# Patient Record
Sex: FEMALE | Race: OTHER
[De-identification: names, ages, dates, MRNs, and addresses within clinical notes are randomized per-mention and may not be internally consistent; named-entity substitution may affect disease eponyms.]

---

## 2017-03-10 ENCOUNTER — RX RENEWAL (OUTPATIENT)
Age: 47
End: 2017-03-10

## 2017-03-21 DIAGNOSIS — R92.8 OTHER ABNORMAL AND INCONCLUSIVE FINDINGS ON DIAGNOSTIC IMAGING OF BREAST: ICD-10-CM

## 2017-03-24 ENCOUNTER — OUTPATIENT (OUTPATIENT)
Dept: OUTPATIENT SERVICES | Facility: HOSPITAL | Age: 47
LOS: 1 days | Discharge: HOME | End: 2017-03-24

## 2017-05-24 ENCOUNTER — RX RENEWAL (OUTPATIENT)
Age: 47
End: 2017-05-24

## 2017-06-27 DIAGNOSIS — R92.8 OTHER ABNORMAL AND INCONCLUSIVE FINDINGS ON DIAGNOSTIC IMAGING OF BREAST: ICD-10-CM

## 2017-07-17 ENCOUNTER — RX RENEWAL (OUTPATIENT)
Age: 47
End: 2017-07-17

## 2017-08-11 ENCOUNTER — APPOINTMENT (OUTPATIENT)
Dept: HEMATOLOGY ONCOLOGY | Facility: CLINIC | Age: 47
End: 2017-08-11

## 2017-08-18 ENCOUNTER — OUTPATIENT (OUTPATIENT)
Dept: OUTPATIENT SERVICES | Facility: HOSPITAL | Age: 47
LOS: 1 days | Discharge: HOME | End: 2017-08-18

## 2017-08-18 ENCOUNTER — APPOINTMENT (OUTPATIENT)
Dept: HEMATOLOGY ONCOLOGY | Facility: CLINIC | Age: 47
End: 2017-08-18

## 2017-08-18 VITALS
BODY MASS INDEX: 26.87 KG/M2 | HEART RATE: 80 BPM | DIASTOLIC BLOOD PRESSURE: 68 MMHG | HEIGHT: 62 IN | SYSTOLIC BLOOD PRESSURE: 102 MMHG | WEIGHT: 146 LBS | TEMPERATURE: 99.1 F | RESPIRATION RATE: 16 BRPM

## 2017-08-21 DIAGNOSIS — D05.01 LOBULAR CARCINOMA IN SITU OF RIGHT BREAST: ICD-10-CM

## 2017-08-21 DIAGNOSIS — D05.02 LOBULAR CARCINOMA IN SITU OF LEFT BREAST: ICD-10-CM

## 2017-08-27 LAB — B-HCG SERPL-ACNC: < 0.6 MIU/ML

## 2017-09-14 ENCOUNTER — APPOINTMENT (OUTPATIENT)
Dept: BREAST CENTER | Facility: CLINIC | Age: 47
End: 2017-09-14
Payer: MEDICAID

## 2017-09-14 VITALS
HEIGHT: 62 IN | BODY MASS INDEX: 26.31 KG/M2 | WEIGHT: 143 LBS | DIASTOLIC BLOOD PRESSURE: 80 MMHG | SYSTOLIC BLOOD PRESSURE: 112 MMHG

## 2017-09-14 VITALS
DIASTOLIC BLOOD PRESSURE: 72 MMHG | SYSTOLIC BLOOD PRESSURE: 110 MMHG | BODY MASS INDEX: 26.87 KG/M2 | HEIGHT: 62 IN | WEIGHT: 146 LBS

## 2017-09-14 DIAGNOSIS — E03.9 HYPOTHYROIDISM, UNSPECIFIED: ICD-10-CM

## 2017-09-14 PROCEDURE — 99213 OFFICE O/P EST LOW 20 MIN: CPT

## 2017-09-14 RX ORDER — LEVOTHYROXINE SODIUM 0.17 MG/1
TABLET ORAL
Refills: 0 | Status: ACTIVE | COMMUNITY

## 2017-11-14 ENCOUNTER — RX RENEWAL (OUTPATIENT)
Age: 47
End: 2017-11-14

## 2017-11-15 ENCOUNTER — RX RENEWAL (OUTPATIENT)
Age: 47
End: 2017-11-15

## 2018-02-14 ENCOUNTER — RX RENEWAL (OUTPATIENT)
Age: 48
End: 2018-02-14

## 2018-02-23 ENCOUNTER — APPOINTMENT (OUTPATIENT)
Dept: HEMATOLOGY ONCOLOGY | Facility: CLINIC | Age: 48
End: 2018-02-23

## 2018-03-13 ENCOUNTER — OUTPATIENT (OUTPATIENT)
Dept: OUTPATIENT SERVICES | Facility: HOSPITAL | Age: 48
LOS: 1 days | Discharge: HOME | End: 2018-03-13

## 2018-03-13 DIAGNOSIS — Z12.31 ENCOUNTER FOR SCREENING MAMMOGRAM FOR MALIGNANT NEOPLASM OF BREAST: ICD-10-CM

## 2018-03-22 ENCOUNTER — APPOINTMENT (OUTPATIENT)
Dept: BREAST CENTER | Facility: CLINIC | Age: 48
End: 2018-03-22
Payer: MEDICAID

## 2018-03-22 VITALS
SYSTOLIC BLOOD PRESSURE: 128 MMHG | HEART RATE: 120 BPM | HEIGHT: 62 IN | BODY MASS INDEX: 32.2 KG/M2 | OXYGEN SATURATION: 96 % | WEIGHT: 175 LBS | DIASTOLIC BLOOD PRESSURE: 80 MMHG

## 2018-03-22 DIAGNOSIS — Z80.3 FAMILY HISTORY OF MALIGNANT NEOPLASM OF BREAST: ICD-10-CM

## 2018-03-22 PROCEDURE — 99212 OFFICE O/P EST SF 10 MIN: CPT

## 2018-03-28 ENCOUNTER — APPOINTMENT (OUTPATIENT)
Dept: HEMATOLOGY ONCOLOGY | Facility: CLINIC | Age: 48
End: 2018-03-28

## 2018-04-14 ENCOUNTER — EMERGENCY (EMERGENCY)
Facility: HOSPITAL | Age: 48
LOS: 0 days | Discharge: HOME | End: 2018-04-14
Attending: STUDENT IN AN ORGANIZED HEALTH CARE EDUCATION/TRAINING PROGRAM

## 2018-04-14 VITALS
OXYGEN SATURATION: 100 % | SYSTOLIC BLOOD PRESSURE: 134 MMHG | DIASTOLIC BLOOD PRESSURE: 67 MMHG | TEMPERATURE: 98 F | RESPIRATION RATE: 18 BRPM | HEART RATE: 100 BPM

## 2018-04-14 VITALS
RESPIRATION RATE: 18 BRPM | TEMPERATURE: 97 F | HEART RATE: 90 BPM | OXYGEN SATURATION: 100 % | SYSTOLIC BLOOD PRESSURE: 132 MMHG | DIASTOLIC BLOOD PRESSURE: 67 MMHG

## 2018-04-14 DIAGNOSIS — R10.9 UNSPECIFIED ABDOMINAL PAIN: ICD-10-CM

## 2018-04-14 DIAGNOSIS — R10.12 LEFT UPPER QUADRANT PAIN: ICD-10-CM

## 2018-04-14 DIAGNOSIS — R10.13 EPIGASTRIC PAIN: ICD-10-CM

## 2018-04-14 DIAGNOSIS — F41.9 ANXIETY DISORDER, UNSPECIFIED: ICD-10-CM

## 2018-04-14 DIAGNOSIS — E03.9 HYPOTHYROIDISM, UNSPECIFIED: ICD-10-CM

## 2018-04-14 LAB
ALBUMIN SERPL ELPH-MCNC: 3.6 G/DL — SIGNIFICANT CHANGE UP (ref 3.5–5.2)
ALP SERPL-CCNC: 47 U/L — SIGNIFICANT CHANGE UP (ref 30–115)
ALT FLD-CCNC: 14 U/L — SIGNIFICANT CHANGE UP (ref 0–41)
ANION GAP SERPL CALC-SCNC: 11 MMOL/L — SIGNIFICANT CHANGE UP (ref 7–14)
APPEARANCE UR: (no result)
AST SERPL-CCNC: 16 U/L — SIGNIFICANT CHANGE UP (ref 0–41)
BASOPHILS # BLD AUTO: 0.03 K/UL — SIGNIFICANT CHANGE UP (ref 0–0.2)
BASOPHILS NFR BLD AUTO: 0.3 % — SIGNIFICANT CHANGE UP (ref 0–1)
BILIRUB SERPL-MCNC: 0.2 MG/DL — SIGNIFICANT CHANGE UP (ref 0.2–1.2)
BILIRUB UR-MCNC: NEGATIVE — SIGNIFICANT CHANGE UP
BUN SERPL-MCNC: 13 MG/DL — SIGNIFICANT CHANGE UP (ref 10–20)
CALCIUM SERPL-MCNC: 8.4 MG/DL — LOW (ref 8.5–10.1)
CHLORIDE SERPL-SCNC: 100 MMOL/L — SIGNIFICANT CHANGE UP (ref 98–110)
CO2 SERPL-SCNC: 24 MMOL/L — SIGNIFICANT CHANGE UP (ref 17–32)
COLOR SPEC: YELLOW — SIGNIFICANT CHANGE UP
CREAT SERPL-MCNC: 0.6 MG/DL — LOW (ref 0.7–1.5)
DIFF PNL FLD: (no result)
EOSINOPHIL # BLD AUTO: 0.14 K/UL — SIGNIFICANT CHANGE UP (ref 0–0.7)
EOSINOPHIL NFR BLD AUTO: 1.4 % — SIGNIFICANT CHANGE UP (ref 0–8)
GLUCOSE SERPL-MCNC: 99 MG/DL — SIGNIFICANT CHANGE UP (ref 70–99)
GLUCOSE UR QL: NEGATIVE MG/DL — SIGNIFICANT CHANGE UP
HCT VFR BLD CALC: 31.5 % — LOW (ref 37–47)
HGB BLD-MCNC: 10.2 G/DL — LOW (ref 12–16)
IMM GRANULOCYTES NFR BLD AUTO: 0.3 % — SIGNIFICANT CHANGE UP (ref 0.1–0.3)
KETONES UR-MCNC: NEGATIVE — SIGNIFICANT CHANGE UP
LEUKOCYTE ESTERASE UR-ACNC: (no result)
LIDOCAIN IGE QN: 149 U/L — HIGH (ref 7–60)
LYMPHOCYTES # BLD AUTO: 2.18 K/UL — SIGNIFICANT CHANGE UP (ref 1.2–3.4)
LYMPHOCYTES # BLD AUTO: 22.3 % — SIGNIFICANT CHANGE UP (ref 20.5–51.1)
MCHC RBC-ENTMCNC: 27 PG — SIGNIFICANT CHANGE UP (ref 27–31)
MCHC RBC-ENTMCNC: 32.4 G/DL — SIGNIFICANT CHANGE UP (ref 32–37)
MCV RBC AUTO: 83.3 FL — SIGNIFICANT CHANGE UP (ref 81–99)
MONOCYTES # BLD AUTO: 0.9 K/UL — HIGH (ref 0.1–0.6)
MONOCYTES NFR BLD AUTO: 9.2 % — SIGNIFICANT CHANGE UP (ref 1.7–9.3)
NEUTROPHILS # BLD AUTO: 6.49 K/UL — SIGNIFICANT CHANGE UP (ref 1.4–6.5)
NEUTROPHILS NFR BLD AUTO: 66.5 % — SIGNIFICANT CHANGE UP (ref 42.2–75.2)
NITRITE UR-MCNC: NEGATIVE — SIGNIFICANT CHANGE UP
PH UR: 6 — SIGNIFICANT CHANGE UP (ref 5–8)
PLATELET # BLD AUTO: 397 K/UL — SIGNIFICANT CHANGE UP (ref 130–400)
POTASSIUM SERPL-MCNC: 4.4 MMOL/L — SIGNIFICANT CHANGE UP (ref 3.5–5)
POTASSIUM SERPL-SCNC: 4.4 MMOL/L — SIGNIFICANT CHANGE UP (ref 3.5–5)
PROT SERPL-MCNC: 7 G/DL — SIGNIFICANT CHANGE UP (ref 6–8)
PROT UR-MCNC: (no result) MG/DL
RBC # BLD: 3.78 M/UL — LOW (ref 4.2–5.4)
RBC # FLD: 13.2 % — SIGNIFICANT CHANGE UP (ref 11.5–14.5)
SODIUM SERPL-SCNC: 135 MMOL/L — SIGNIFICANT CHANGE UP (ref 135–146)
SP GR SPEC: 1.02 — SIGNIFICANT CHANGE UP (ref 1.01–1.03)
UROBILINOGEN FLD QL: 0.2 MG/DL — SIGNIFICANT CHANGE UP (ref 0.2–0.2)
WBC # BLD: 9.77 K/UL — SIGNIFICANT CHANGE UP (ref 4.8–10.8)
WBC # FLD AUTO: 9.77 K/UL — SIGNIFICANT CHANGE UP (ref 4.8–10.8)

## 2018-04-14 RX ORDER — DIPHENHYDRAMINE HYDROCHLORIDE AND LIDOCAINE HYDROCHLORIDE AND ALUMINUM HYDROXIDE AND MAGNESIUM HYDRO
30 KIT ONCE
Qty: 0 | Refills: 0 | Status: COMPLETED | OUTPATIENT
Start: 2018-04-14 | End: 2018-04-14

## 2018-04-14 RX ORDER — ACETAMINOPHEN 500 MG
975 TABLET ORAL ONCE
Qty: 0 | Refills: 0 | Status: COMPLETED | OUTPATIENT
Start: 2018-04-14 | End: 2018-04-14

## 2018-04-14 RX ORDER — IBUPROFEN 200 MG
600 TABLET ORAL ONCE
Qty: 0 | Refills: 0 | Status: COMPLETED | OUTPATIENT
Start: 2018-04-14 | End: 2018-04-14

## 2018-04-14 RX ADMIN — DIPHENHYDRAMINE HYDROCHLORIDE AND LIDOCAINE HYDROCHLORIDE AND ALUMINUM HYDROXIDE AND MAGNESIUM HYDRO 30 MILLILITER(S): KIT at 12:24

## 2018-04-14 RX ADMIN — Medication 975 MILLIGRAM(S): at 12:24

## 2018-04-14 RX ADMIN — Medication 600 MILLIGRAM(S): at 12:25

## 2018-04-14 NOTE — ED PROVIDER NOTE - PHYSICAL EXAMINATION
Constitutional: Well developed, well nourished. NAD.  Head: Normocephalic, atraumatic.  Eyes: PERRL. EOMI.  ENT: No nasal discharge. Mucous membranes moist.  Neck: Supple. Painless ROM.  Cardiovascular: Normal S1, S2. Regular rate and rhythm. No murmurs, rubs, or gallops.  Pulmonary: Normal respiratory rate and effort. Lungs clear to auscultation bilaterally. No wheezing, rales, or rhonchi.  Abdominal: Soft. Nondistended. Mild LUQ tenderness. No rebound, guarding, rigidity.  Extremities. Pelvis stable. No lower extremity edema, symmetric calves.  Skin: No rashes, cyanosis.  Neuro: AAOx3. No focal neurological deficits.  Psych: Flat affect. No SI/HI.

## 2018-04-14 NOTE — ED PROVIDER NOTE - OBJECTIVE STATEMENT
Pt is a 47 y/o female with hx of hypothyroidism, anxiety, uterine fibroids presents to ED for abd pain for 1 month, intermittent. Pt states pain is worse when her anxiety is acting up. Pt states pain is worse in LUQ. No n/v/d, fever, urinary complaints, vaginal bleeding/discharge. Pt states her anxiety is not worse than usual. No SI/HI. Pt does not want to talk to psychiatrist.

## 2018-04-14 NOTE — ED PROVIDER NOTE - ATTENDING CONTRIBUTION TO CARE
49 yo F pmh thyroid d/o, p/w intermittent R sided crampy/ sharp abd pain x 2wks.  Worse w/ moveemnt, relieved in some position.  NO fever, cp, sob, v/d, urinary sx, hx renal stones.  PE benign.  IMP: abd pain, NL exam.  P: analgesia, labs, ekg, ua, reassess.

## 2018-04-14 NOTE — ED PROVIDER NOTE - PROGRESS NOTE DETAILS
Labs reviewed and discussed with patient. Patient feels better at this time. Mild epigastric pain but no RUQ pain. Pt will follow up with PCP in 2 days. Strict return precautions given. Pt understands and agrees with plan.

## 2018-06-22 ENCOUNTER — OUTPATIENT (OUTPATIENT)
Dept: OUTPATIENT SERVICES | Facility: HOSPITAL | Age: 48
LOS: 1 days | Discharge: HOME | End: 2018-06-22

## 2018-06-22 ENCOUNTER — APPOINTMENT (OUTPATIENT)
Dept: HEMATOLOGY ONCOLOGY | Facility: CLINIC | Age: 48
End: 2018-06-22

## 2018-06-22 ENCOUNTER — LABORATORY RESULT (OUTPATIENT)
Age: 48
End: 2018-06-22

## 2018-06-22 VITALS
WEIGHT: 198 LBS | HEIGHT: 62 IN | BODY MASS INDEX: 36.44 KG/M2 | RESPIRATION RATE: 16 BRPM | DIASTOLIC BLOOD PRESSURE: 82 MMHG | SYSTOLIC BLOOD PRESSURE: 110 MMHG | HEART RATE: 102 BPM | TEMPERATURE: 99.1 F

## 2018-06-22 RX ORDER — TAMOXIFEN CITRATE 20 MG/1
20 TABLET, FILM COATED ORAL DAILY
Qty: 90 | Refills: 2 | Status: ACTIVE | COMMUNITY
Start: 2017-08-18 | End: 1900-01-01

## 2018-06-25 DIAGNOSIS — D05.02 LOBULAR CARCINOMA IN SITU OF LEFT BREAST: ICD-10-CM

## 2018-06-25 DIAGNOSIS — D05.01 LOBULAR CARCINOMA IN SITU OF RIGHT BREAST: ICD-10-CM

## 2018-08-11 LAB
ALBUMIN SERPL ELPH-MCNC: 3.7 G/DL
ALP BLD-CCNC: 57 U/L
ALT SERPL-CCNC: 14 U/L
ANION GAP SERPL CALC-SCNC: 13 MMOL/L
AST SERPL-CCNC: 16 U/L
BILIRUB SERPL-MCNC: <0.2 MG/DL
BUN SERPL-MCNC: 13 MG/DL
CALCIUM SERPL-MCNC: 8.8 MG/DL
CHLORIDE SERPL-SCNC: 101 MMOL/L
CO2 SERPL-SCNC: 26 MMOL/L
CREAT SERPL-MCNC: 0.7 MG/DL
GLUCOSE SERPL-MCNC: 109 MG/DL
HCT VFR BLD CALC: 33 %
HGB BLD-MCNC: 10.4 G/DL
MCHC RBC-ENTMCNC: 25.4 PG
MCHC RBC-ENTMCNC: 31.5 G/DL
MCV RBC AUTO: 80.7 FL
PLATELET # BLD AUTO: 362 K/UL
PMV BLD: 9.2 FL
POTASSIUM SERPL-SCNC: 3.9 MMOL/L
PROT SERPL-MCNC: 7.2 G/DL
RBC # BLD: 4.09 M/UL
RBC # FLD: 14.6 %
SODIUM SERPL-SCNC: 140 MMOL/L
T4 FREE SERPL-MCNC: 1.3 NG/DL
TSH SERPL-ACNC: 2.69 UIU/ML
WBC # FLD AUTO: 8.15 K/UL

## 2018-10-26 ENCOUNTER — RX RENEWAL (OUTPATIENT)
Age: 48
End: 2018-10-26

## 2019-01-11 ENCOUNTER — APPOINTMENT (OUTPATIENT)
Dept: HEMATOLOGY ONCOLOGY | Facility: CLINIC | Age: 49
End: 2019-01-11

## 2019-01-11 ENCOUNTER — OUTPATIENT (OUTPATIENT)
Dept: OUTPATIENT SERVICES | Facility: HOSPITAL | Age: 49
LOS: 1 days | Discharge: HOME | End: 2019-01-11

## 2019-01-11 VITALS
HEIGHT: 62 IN | DIASTOLIC BLOOD PRESSURE: 71 MMHG | BODY MASS INDEX: 36.44 KG/M2 | TEMPERATURE: 97.6 F | HEART RATE: 85 BPM | WEIGHT: 198 LBS | SYSTOLIC BLOOD PRESSURE: 114 MMHG | RESPIRATION RATE: 14 BRPM

## 2019-01-11 VITALS — WEIGHT: 185 LBS | BODY MASS INDEX: 33.84 KG/M2

## 2019-01-11 PROBLEM — F41.9 ANXIETY DISORDER, UNSPECIFIED: Chronic | Status: ACTIVE | Noted: 2018-04-14

## 2019-01-11 PROBLEM — E03.9 HYPOTHYROIDISM, UNSPECIFIED: Chronic | Status: ACTIVE | Noted: 2018-04-14

## 2019-01-11 NOTE — CONSULT LETTER
[Dear  ___] : Dear  [unfilled], [Courtesy Letter:] : I had the pleasure of seeing your patient, [unfilled], in my office today. [Please see my note below.] : Please see my note below. [Sincerely,] : Sincerely, [FreeTextEntry3] : Damion Campbell MD

## 2019-01-11 NOTE — HISTORY OF PRESENT ILLNESS
[de-identified] : This is a 46yearold  female who is here today for her followup visit.  She has a history of left breast lobular carcinoma in situ and atypical lobular hyperplasia.  She had left breast lumpectomy in 04/2013.  In 12/2014, she had a right breast needlelocalized lumpectomy which revealed focal atypical ductal hyperplasia, widespread lobular carcinoma in situ, classical type associated with calcifications and atypical lobular hyperplasia.  Since 12/2014, she started on tamoxifen for breast cancer risk reduction.  She was also found to have uterine fibroids and followed up by her GYN.  On 3/24/17, she had left breast dx mammo and sonogram, which showed benign cyst. \par Rest of review of systems is negative.\par \par  [de-identified] : 6/22/18: \par Doing well. Denies any major complaints. Did not Tamoxifen for one month as she ran out of prescription. Menstrual cycles are irregular. \par \par 1/11/19\par Patient came in today for follow up visit. She offers no new breast complaints.  She has been taking Tamoxifen since 12/2014 without complaints. Menstrual cycles have been irregular.  LMP 11/2018.  She saw her GYN 9/2018 and had ultrasound done.  Pt states it was normal.  Last mammogram was done 3/2018 which showed no malignancy.  MRI of breast was ordered by Dr. Johnson on 9/2018 which was not done yet. (insurance issues?). Last mammo in March 2018 was normal.

## 2019-01-11 NOTE — PHYSICAL EXAM
[Fully active, able to carry on all pre-disease performance without restriction] : Status 0 - Fully active, able to carry on all pre-disease performance without restriction [Normal] : affect appropriate [de-identified] : Status post bilateral lumpectomy.  There is no palpable mass or skin lesion.  No palpable axillary lymphadenopathy.

## 2019-01-11 NOTE — ASSESSMENT
[FreeTextEntry1] : 1. History of left breast lobular carcinoma in situ and atypical lobular hyperplasia, status post lumpectomy.\par 2. Right breast lobular carcinoma in situ atypical lobular hyperplasia and atypical ductal hyperplasia status post right breast lumpectomy.\par \par PLAN:  \par -- Continue Tamoxifen for breast cancer risk reduction.  A prescription was electronically prescribed for her. She     will finish 5 years in 12/2019. \par -- Bilateral screening mammogram in March 2019.\par -- Will order b/l breast MRI in 9/2019. \par -- Follow up with GYN regularly.\par -- Follow up visit in 6 months with Dr. Campbell. \par \par Case was seen and discussed with Dr. Campbell who agreed with the assessment and plan.\par \par \par

## 2019-01-14 DIAGNOSIS — D05.02 LOBULAR CARCINOMA IN SITU OF LEFT BREAST: ICD-10-CM

## 2019-01-14 DIAGNOSIS — Z79.810 LONG TERM (CURRENT) USE OF SELECTIVE ESTROGEN RECEPTOR MODULATORS (SERMS): ICD-10-CM

## 2019-01-14 DIAGNOSIS — D05.01 LOBULAR CARCINOMA IN SITU OF RIGHT BREAST: ICD-10-CM

## 2019-04-23 ENCOUNTER — FORM ENCOUNTER (OUTPATIENT)
Age: 49
End: 2019-04-23

## 2019-04-24 ENCOUNTER — OUTPATIENT (OUTPATIENT)
Dept: OUTPATIENT SERVICES | Facility: HOSPITAL | Age: 49
LOS: 1 days | Discharge: HOME | End: 2019-04-24
Payer: MEDICAID

## 2019-04-24 DIAGNOSIS — R92.2 INCONCLUSIVE MAMMOGRAM: ICD-10-CM

## 2019-04-24 DIAGNOSIS — Z12.31 ENCOUNTER FOR SCREENING MAMMOGRAM FOR MALIGNANT NEOPLASM OF BREAST: ICD-10-CM

## 2019-04-24 PROCEDURE — 76641 ULTRASOUND BREAST COMPLETE: CPT | Mod: 26,50

## 2019-04-24 PROCEDURE — 77067 SCR MAMMO BI INCL CAD: CPT | Mod: 26

## 2019-04-24 PROCEDURE — 77063 BREAST TOMOSYNTHESIS BI: CPT | Mod: 26

## 2019-05-02 ENCOUNTER — APPOINTMENT (OUTPATIENT)
Dept: BREAST CENTER | Facility: CLINIC | Age: 49
End: 2019-05-02
Payer: MEDICAID

## 2019-05-02 VITALS
TEMPERATURE: 98.6 F | BODY MASS INDEX: 31.83 KG/M2 | WEIGHT: 173 LBS | HEIGHT: 62 IN | SYSTOLIC BLOOD PRESSURE: 124 MMHG | DIASTOLIC BLOOD PRESSURE: 74 MMHG

## 2019-05-02 PROCEDURE — 99212 OFFICE O/P EST SF 10 MIN: CPT

## 2019-05-02 RX ORDER — HYDROXYZINE PAMOATE 25 MG/1
25 CAPSULE ORAL
Qty: 60 | Refills: 0 | Status: ACTIVE | COMMUNITY
Start: 2019-03-22

## 2019-05-02 RX ORDER — BENZTROPINE MESYLATE 1 MG/1
1 TABLET ORAL
Qty: 60 | Refills: 0 | Status: ACTIVE | COMMUNITY
Start: 2019-04-22

## 2019-05-02 NOTE — REVIEW OF SYSTEMS
[Chills] : no chills [Breast Pain] : no breast pain [Fever] : no fever [Breast Lump] : no breast lump [Breast Swelling] : no breast swelling [Breast Reddening] : no reddening of the breast [Breast Warmth] : no breast warmth [Breast Itching] : no breast itching [Enlargement] : no breast enlargement ['Orange Peel' Appearance] : no 'orange peel' appearance of breast skin [Dimpling Of Skin] : no dimpling of breast skin [Decreasing In Size] : breast size not decreasing [Nipple Inverted] : no inversion of the nipple [Nipple Discharge] : no nipple discharge

## 2019-05-02 NOTE — ASSESSMENT
[FreeTextEntry1] : 49 year old female with strong family history of breast cancer in her maternal grandmother, who is high risk for breast cancer with a lifetime Lauren Model of 24.3%.  She has a history of left breast ADH, ALH and LCIS in 2013 and right breast FEA, ALH and ADH in 2014, status post bilateral lumpectomy demonstrating the same.  She has no prior complaints related to the breasts.\par \par A bilateral screening mammogram was performed in April.  This demonstrated heterogenously dense breasts.  There are areas of benign architectural distortion corresponding to the sites of surgery in both breasts with no suspicious masses, groupings of calcifications or other abnormalities identified.\par \par She is here for evaluation of these findings.  At this time, these findings do not present the need for surgical intervention.  She has a benign clinical breast examination and no current complaints related to the breasts. She has missed the last two years that we have sent the patient for high risk screening with bilateral breast MRI.  Once again, bilateral breast screening with MRI due to the patient's high risk status was discussed and all questions answered.  She opts to have the MRI and we will schedule it for her for October of this year, with subsequent follow up clinical breast examination.  I spent a total of 10 minutes of face to face time with this patient, greater than 50% of which was spent in counseling and/or coordination of care.  All of her questions were appropriately answered.\par

## 2019-05-02 NOTE — DATA REVIEWED
[FreeTextEntry1] : EXAM:  MG MAMMO SCREEN W CHAIM BI#      \par PROCEDURE DATE:  04/24/2019  \par INTERPRETATION:  HISTORY:\par Bilateral MG MAMMO SCREEN W CHAIM BI# was performed. Patient is 49 years \par old and is seen for screening. The patient has no personal history of \par cancer.  The patient has a history of right excisional biopsy in \par December, 2014 - high risk - LCIS and left excisional biopsy in April, \par 2013 - benign.   The patient has the following family history of breast \par cancer:  maternal grandmother, at age 60, breast cancer and maternal \par grandmother, at age 70, breast cancer.\par RISK ASSESSMENT:\par NCI Lifetime Risk: 18.6\par Tyrer-Cuzick Lifetime Risk: 21.7\par CLINICAL BREAST EXAM:\par The patient reports her last clinical breast exam was performed 3 months \par ago.\par COMPARISON STUDIES:\par The present examination has been compared to prior imaging studies \par performed at [facility] on 03/24/2017 and \par 03/13/2018.\par MAMMOGRAM FINDINGS:\par Mammography was performed including the following views: bilateral \par craniocaudal with tomosynthesis, bilateral mediolateral oblique with \par tomosynthesis.  The examination includes digital synthetic 2D and digital \par tomosynthesis 3D images. Additional imaging analysis was performed using \par CAD (computer-aided detection) software.\par The breasts are heterogeneously dense, which may obscure small masses.\par There are areas of benign architectural distortion corresponding to the \par site of surgery seen in both breasts.\par No suspicious mass, grouping of calcifications, or other abnormality is \par identified.\par IMPRESSION:\par There is no mammographic evidence of malignancy.\par RECOMMENDATION:\par Unless otherwise indicated by clinical findings, annual screening \par mammography recommended.\par ASSESSMENT:\par BI-RADS Category 2:  Benign\par Given the patient's history, she meets the American Cancer Society \par guidelines for annual screening with breast MRI in addition to annual \par mammography (i.e., lifetime risk greater than 20-25%)\par The patient will be notified of these results by telephone, and will also \par be mailed a written summary in layman's terms.\par TACO KEMP M.D., ATTENDING RADIOLOGIST\par This document has been electronically signed. Apr 24 2019  3:30PM\par   \par \par \par \par EXAM:  US BREAST COMPLETE BI      \par PROCEDURE DATE:  04/24/2019  \par INTERPRETATION:  Clinical History / Reason for exam: Dense breasts.\par Whole Bilateral Breast Sonogram:\par In the right breast, no solid/cystic lesions or areas of abnormal \par shadowing are seen.\par In the left breast, there is a cyst at the 2:00 location 8 cm from the \par nipple measuring 0.6 cm x 0.4 cm x 0.3 cm. No suspicious lesions are seen \par in the left breast.\par Evaluation of both axillary regions demonstrates normal-appearing lymph \par nodes.\par Impression:\par No evidence of malignancy.\par Recommendation: Unless otherwise indicated by clinical findings, annual \par screening mammography recommended.\par BI-RADS Category 2: Benign\par TACO KEMP M.D., ATTENDING RADIOLOGIST\par This document has been electronically signed. Apr 24 2019  3:28PM\par   \par

## 2019-05-02 NOTE — PHYSICAL EXAM
[Examined in the supine and seated position] : examined in the supine and seated position [Symmetrical] : symmetrical [No dominant masses] : no dominant masses left breast [No dominant masses] : no dominant masses in right breast  [No Nipple Retraction] : no left nipple retraction [Breast Mass Right Breast ___cm] : no masses [No Nipple Discharge] : no right nipple discharge [Breast Nipple Retraction] : nipples not retracted [Breast Nipple Inversion] : nipples not inverted [Breast Mass Left Breast ___cm] : no masses [Breast Nipple Flattening] : nipples not flattened [Breast Nipple Fissures] : nipples not fissured [Breast Abnormal Lactation (Galactorrhea)] : no galactorrhea [Breast Abnormal Secretion Bloody Fluid] : no bloody discharge [Breast Abnormal Secretion Serous Fluid] : no serous discharge [No Axillary Lymphadenopathy] : no right axillary lymphadenopathy [Breast Abnormal Secretion Opalescent Fluid] : no milky discharge [No Swelling] : no swelling [No Edema] : no edema [Full ROM] : full range of motion [No Rashes] : no rashes

## 2019-05-02 NOTE — PAST MEDICAL HISTORY
[Menstruating] : The patient is menstruating [Menarche Age ____] : age at menarche was [unfilled] [Irregular Cycle Intervals] : are  irregular [Total Preg ___] : G[unfilled] [FreeTextEntry5] : uterine myomectomy [FreeTextEntry6] : none [FreeTextEntry7] : none [FreeTextEntry8] : none

## 2019-05-02 NOTE — HISTORY OF PRESENT ILLNESS
[FreeTextEntry1] : Patient with Left LCIS/ALH on stereotactic biopsy 3/15/13.\par s/p Left NLOC 4/17/13- ADH, LCIS, ALH, small FA. \par \par s/p MRI Guided Core Bx - 09/24/14 - Right UOQ = FEA, ALH both assoc w/ ca++. FC changes.\par s/p MRI Guided Core Bx - 10/17/14 - Right UOQ = ADH, FEA both assoc w/ ca++. FC changes. FA.\par s/p MRI Guided Core Bx - 10/17/14 - Right LOQ = ADH, FEA both assoc w/ ca++. FC changes.\par s/p Right NLOC - 12/09/14 = Superior mass: ADH, LCIS, ALH.  Inferior mass: ALH.\par \par Maternal grandmother with breast cancer in her 70's.\par Her Lauren Model risk assessment is:\par 2.1 % in five years \par 24.3 % in her lifetime. \par \par Patient is on Tamoxifen.

## 2019-07-25 ENCOUNTER — APPOINTMENT (OUTPATIENT)
Dept: HEMATOLOGY ONCOLOGY | Facility: CLINIC | Age: 49
End: 2019-07-25
Payer: MEDICAID

## 2019-07-25 ENCOUNTER — OUTPATIENT (OUTPATIENT)
Dept: OUTPATIENT SERVICES | Facility: HOSPITAL | Age: 49
LOS: 1 days | Discharge: HOME | End: 2019-07-25

## 2019-07-25 VITALS
TEMPERATURE: 98.8 F | BODY MASS INDEX: 33.57 KG/M2 | SYSTOLIC BLOOD PRESSURE: 100 MMHG | HEART RATE: 99 BPM | RESPIRATION RATE: 16 BRPM | DIASTOLIC BLOOD PRESSURE: 63 MMHG | HEIGHT: 60 IN | WEIGHT: 171 LBS

## 2019-07-25 DIAGNOSIS — Z51.81 ENCOUNTER FOR THERAPEUTIC DRUG LVL MONITORING: ICD-10-CM

## 2019-07-25 DIAGNOSIS — Z79.810 ENCOUNTER FOR THERAPEUTIC DRUG LVL MONITORING: ICD-10-CM

## 2019-07-25 PROCEDURE — 99214 OFFICE O/P EST MOD 30 MIN: CPT

## 2019-07-25 RX ORDER — TAMOXIFEN CITRATE 20 MG/1
20 TABLET, FILM COATED ORAL DAILY
Qty: 30 | Refills: 5 | Status: ACTIVE | COMMUNITY
Start: 2017-07-17 | End: 1900-01-01

## 2019-07-28 PROBLEM — Z51.81 ENCOUNTER FOR MONITORING TAMOXIFEN THERAPY: Status: ACTIVE | Noted: 2019-07-28

## 2019-07-28 NOTE — HISTORY OF PRESENT ILLNESS
[de-identified] : This is a 46yearold  female who is here today for her followup visit.  She has a history of left breast lobular carcinoma in situ and atypical lobular hyperplasia.  She had left breast lumpectomy in 04/2013.  In 12/2014, she had a right breast needlelocalized lumpectomy which revealed focal atypical ductal hyperplasia, widespread lobular carcinoma in situ, classical type associated with calcifications and atypical lobular hyperplasia.  Since 12/2014, she started on tamoxifen for breast cancer risk reduction.  She was also found to have uterine fibroids and followed up by her GYN.  On 3/24/17, she had left breast dx mammo and sonogram, which showed benign cyst. \par Rest of review of systems is negative.\par \par  [de-identified] : 6/22/18: \par Doing well. Denies any major complaints. Did not Tamoxifen for one month as she ran out of prescription. Menstrual cycles are irregular. \par \par 1/11/19\par Patient came in today for follow up visit. She offers no new breast complaints.  She has been taking Tamoxifen since 12/2014 without complaints. Menstrual cycles have been irregular.  LMP 11/2018.  She saw her GYN 9/2018 and had ultrasound done.  Pt states it was normal.  Last mammogram was done 3/2018 which showed no malignancy.  MRI of breast was ordered by Dr. Johnson on 9/2018 which was not done yet. (insurance issues?). Last mammo in March 2018 was normal.\par \par 7/25/19\par The patient came in today for her follow up visit.  She offers no new complaints.  She is taking Tamoxifen since 12/2014 and tolerating it well. She will achieve her 5 year cynthia in 12/2019.  She has regular period, LMP monthly.  Last mammogram was done on 4/2019 which shows no evidence of malignancy. MRI of breasts scheduled on 9/2019.\par She saw GYN recently but no ultrasound ordered per pt.

## 2019-07-28 NOTE — ASSESSMENT
[FreeTextEntry1] : 1. History of left breast lobular carcinoma in situ and atypical lobular hyperplasia, status post lumpectomy.\par 2. Right breast lobular carcinoma in situ atypical lobular hyperplasia and atypical ductal hyperplasia status post right breast lumpectomy.\par \par PLAN:  \par -- Continue Tamoxifen for breast cancer risk reduction.  A prescription was electronically prescribed for her. She     will finish 5 years in 12/2019. \par -- Bilateral screening mammogram in April 2020..\par -- Will order b/l breast MRI in 9/2019. \par -- Follow up with GYN regularly.\par -- Follow up visit in 6 months with Dr. Campbell. \par \par Case was seen and discussed with Dr. Campbell who agreed with the assessment and plan.\par \par \par

## 2019-07-28 NOTE — PHYSICAL EXAM
[Fully active, able to carry on all pre-disease performance without restriction] : Status 0 - Fully active, able to carry on all pre-disease performance without restriction [Normal] : affect appropriate [de-identified] : Status post bilateral lumpectomy.  There is no palpable mass or skin lesion ecept in the right breast at 12 o'clock region.  No palpable axillary lymphadenopathy.

## 2019-07-28 NOTE — END OF VISIT
[] : Fellow [FreeTextEntry3] : The patient was seen and examined by me. Lab and imaging results were reviewed. Recommendation was discussed with the patient. I agree with the progress note which I have reviewed and edited where appropriate.\par \par

## 2019-07-29 DIAGNOSIS — N60.91 UNSPECIFIED BENIGN MAMMARY DYSPLASIA OF RIGHT BREAST: ICD-10-CM

## 2019-07-29 DIAGNOSIS — Z51.81 ENCOUNTER FOR THERAPEUTIC DRUG LEVEL MONITORING: ICD-10-CM

## 2019-07-29 DIAGNOSIS — D05.01 LOBULAR CARCINOMA IN SITU OF RIGHT BREAST: ICD-10-CM

## 2019-10-21 ENCOUNTER — FORM ENCOUNTER (OUTPATIENT)
Age: 49
End: 2019-10-21

## 2019-10-22 ENCOUNTER — OUTPATIENT (OUTPATIENT)
Dept: OUTPATIENT SERVICES | Facility: HOSPITAL | Age: 49
LOS: 1 days | Discharge: HOME | End: 2019-10-22
Payer: MEDICAID

## 2019-10-22 DIAGNOSIS — Z12.39 ENCOUNTER FOR OTHER SCREENING FOR MALIGNANT NEOPLASM OF BREAST: ICD-10-CM

## 2019-10-22 PROCEDURE — 77049 MRI BREAST C-+ W/CAD BI: CPT | Mod: 26

## 2019-11-21 ENCOUNTER — APPOINTMENT (OUTPATIENT)
Dept: BREAST CENTER | Facility: CLINIC | Age: 49
End: 2019-11-21

## 2020-01-23 ENCOUNTER — APPOINTMENT (OUTPATIENT)
Dept: HEMATOLOGY ONCOLOGY | Facility: CLINIC | Age: 50
End: 2020-01-23

## 2020-06-04 ENCOUNTER — OUTPATIENT (OUTPATIENT)
Dept: OUTPATIENT SERVICES | Facility: HOSPITAL | Age: 50
LOS: 1 days | Discharge: HOME | End: 2020-06-04
Payer: MEDICAID

## 2020-06-04 ENCOUNTER — RESULT REVIEW (OUTPATIENT)
Age: 50
End: 2020-06-04

## 2020-06-04 DIAGNOSIS — Z12.31 ENCOUNTER FOR SCREENING MAMMOGRAM FOR MALIGNANT NEOPLASM OF BREAST: ICD-10-CM

## 2020-06-04 DIAGNOSIS — R92.2 INCONCLUSIVE MAMMOGRAM: ICD-10-CM

## 2020-06-04 PROCEDURE — 77063 BREAST TOMOSYNTHESIS BI: CPT | Mod: 26

## 2020-06-04 PROCEDURE — 77067 SCR MAMMO BI INCL CAD: CPT | Mod: 26

## 2020-06-04 PROCEDURE — 76641 ULTRASOUND BREAST COMPLETE: CPT | Mod: 26,50

## 2020-06-25 ENCOUNTER — APPOINTMENT (OUTPATIENT)
Dept: BREAST CENTER | Facility: CLINIC | Age: 50
End: 2020-06-25
Payer: MEDICAID

## 2020-06-25 VITALS
SYSTOLIC BLOOD PRESSURE: 112 MMHG | TEMPERATURE: 98.9 F | DIASTOLIC BLOOD PRESSURE: 80 MMHG | HEIGHT: 60 IN | WEIGHT: 171 LBS | BODY MASS INDEX: 33.57 KG/M2

## 2020-06-25 DIAGNOSIS — N60.91 UNSPECIFIED BENIGN MAMMARY DYSPLASIA OF RIGHT BREAST: ICD-10-CM

## 2020-06-25 DIAGNOSIS — D05.02 LOBULAR CARCINOMA IN SITU OF RIGHT BREAST: ICD-10-CM

## 2020-06-25 DIAGNOSIS — N60.92 UNSPECIFIED BENIGN MAMMARY DYSPLASIA OF RIGHT BREAST: ICD-10-CM

## 2020-06-25 DIAGNOSIS — D05.01 LOBULAR CARCINOMA IN SITU OF RIGHT BREAST: ICD-10-CM

## 2020-06-25 DIAGNOSIS — Z12.39 ENCOUNTER FOR OTHER SCREENING FOR MALIGNANT NEOPLASM OF BREAST: ICD-10-CM

## 2020-06-25 PROCEDURE — 99213 OFFICE O/P EST LOW 20 MIN: CPT

## 2020-06-25 NOTE — HISTORY OF PRESENT ILLNESS
[FreeTextEntry1] : Patient with Left LCIS/ALH on stereotactic biopsy 3/15/13.\par s/p Left NLOC 4/17/13- ADH, LCIS, ALH, small FA. \par \par s/p MRI Guided Core Bx - 09/24/14 - Right UOQ = FEA, ALH both assoc w/ ca++. FC changes.\par s/p MRI Guided Core Bx - 10/17/14 - Right UOQ = ADH, FEA both assoc w/ ca++. FC changes. FA.\par s/p MRI Guided Core Bx - 10/17/14 - Right LOQ = ADH, FEA both assoc w/ ca++. FC changes.\par s/p Right NLOC - 12/09/14 = Superior mass: ADH, LCIS, ALH.  Inferior mass: ALH.\par \par Maternal grandmother with breast cancer in her 70's.\par Her Lauren Model risk assessment is:\par 2.1 % in five years \par 24.3 % in her lifetime. \par \par Patient completed Tamoxifen x 5 years in Dec 2019.\par \par DELVIN BUI is a 50 year old female patient who presents today for follow up for high risk screening.\par Since her last visit, she has no new breast related complaints. \par Imaging was done on 06/04/2020, which revealed no mammographic evidence of malignancy and stable benign cyst left breast.\par \par She presents today for evaluation and imaging review.

## 2020-06-25 NOTE — PAST MEDICAL HISTORY
[Menstruating] : The patient is menstruating [Irregular Cycle Intervals] : are  irregular [Menarche Age ____] : age at menarche was [unfilled] [Total Preg ___] : G[unfilled] [FreeTextEntry6] : none [FreeTextEntry5] : uterine myomectomy [FreeTextEntry7] : none [FreeTextEntry8] : none

## 2020-06-25 NOTE — ASSESSMENT
[FreeTextEntry1] : DELVIN is a 50 year old female with strong family history of breast cancer in her maternal grandmother, who is high risk for breast cancer with a lifetime Lauren Model of 24.3%.  \par She has a history of left breast ADH, ALH and LCIS in 2013 and right breast FEA, ALH and ADH in 2014, status post bilateral lumpectomy demonstrating the same.  \par \par Imaging was done recently which revealed no mammographic evidence of malignancy and stable benign cyst left breast, as detailed above. \par Physical exam was unrevealing today.\par \par Imaging with a bilateral breast MRI will be ordered for November 2020, and that will be scheduled today. \par She will return for follow-up and clinical breast exam in six months with Dr. Johnson.\par \par I spent a total of 15 minutes of face to face time with this patient, greater than 50% of which was spent in counseling and/or coordination of care.\par All of her questions were appropriately answered.\par She knows to call with any concerns.

## 2020-06-25 NOTE — DATA REVIEWED
[FreeTextEntry1] : B/L Screening Mammo - 06/04/2020:\par MAMMOGRAM FINDINGS:\par Mammography was performed including the following views: bilateral craniocaudal with tomosynthesis, bilateral mediolateral oblique with tomosynthesis.  The examination includes digital synthetic 2D and digital tomosynthesis 3D images. Additional imaging analysis was performed using CAD (computer-aided detection) software.\par \par The breasts are heterogeneously dense, which may obscure small masses.\par \par Finding 1:  There are areas of benign architectural distortion corresponding to the site of surgery seen in both breasts.\par \par Finding 2:  There are stable calcifications seen in both breasts.\par \par No suspicious mass, grouping of calcifications, or other abnormality is identified.\par \par IMPRESSION:\par There is no mammographic evidence of malignancy.\par \par RECOMMENDATION:\par Unless otherwise indicated by clinical findings, annual screening mammography recommended.\par \par ASSESSMENT:\par BI-RADS Category 2:  Benign\par \par \par B/L Breast Sono - 06/04/2020:\par Bilateral high-resolution screening ultrasound was performed including bilateral axillae. The background parenchymal echotexture is heterogeneous.\par \par No discrete suspicious solid or cystic mass is noted on the right side. On the left side, at the 2:00 axis, 8 cm from the nipple, there is a 6 x 4 x 3 mm oval benign cystic structure, stable. No suspicious mass is seen.\par \par There is no axillary lymphadenopathy.\par \par IMPRESSION:\par \par Stable benign cyst left breast.\par \par Recommendation: Unless otherwise indicated by clinical findings, annual screening mammography and ultrasound are recommended.\par \par BI-RADS Category 2: Benign finding

## 2020-06-25 NOTE — REVIEW OF SYSTEMS
[Negative] : Constitutional [Breast Pain] : no breast pain [Nipple Discharge] : no nipple discharge [Nipple Inverted] : no inversion of the nipple [Breast Lump] : no breast lump

## 2020-06-25 NOTE — PHYSICAL EXAM
[Atraumatic] : atraumatic [Normocephalic] : normocephalic [No dominant masses] : no dominant masses in right breast  [No dominant masses] : no dominant masses left breast [No Nipple Discharge] : no left nipple discharge [No Rashes] : no rashes [No Ulceration] : no ulceration [de-identified] : well healed surgical scars. [Breast Nipple Inversion] : nipples not inverted [Breast Nipple Retraction] : nipples not retracted [de-identified] : No axillary lymphadenopathy appreciated. [de-identified] : No axillary lymphadenopathy appreciated.

## 2020-11-19 ENCOUNTER — RESULT REVIEW (OUTPATIENT)
Age: 50
End: 2020-11-19

## 2020-11-19 ENCOUNTER — OUTPATIENT (OUTPATIENT)
Dept: OUTPATIENT SERVICES | Facility: HOSPITAL | Age: 50
LOS: 1 days | Discharge: HOME | End: 2020-11-19
Payer: MEDICAID

## 2020-11-19 DIAGNOSIS — N60.91 UNSPECIFIED BENIGN MAMMARY DYSPLASIA OF RIGHT BREAST: ICD-10-CM

## 2020-11-19 DIAGNOSIS — D05.01 LOBULAR CARCINOMA IN SITU OF RIGHT BREAST: ICD-10-CM

## 2020-11-19 PROCEDURE — 77049 MRI BREAST C-+ W/CAD BI: CPT | Mod: 26

## 2020-11-23 ENCOUNTER — NON-APPOINTMENT (OUTPATIENT)
Age: 50
End: 2020-11-23

## 2021-05-03 NOTE — ED ADULT NURSE NOTE - DATE/TIME PROVIDED
Patient is scheduled in clinic with Dr. Pool on 5/19/21.  Labs printed and placed in accordion folder for chart prep.   14-Apr-2018 13:55

## 2021-06-10 ENCOUNTER — RESULT REVIEW (OUTPATIENT)
Age: 51
End: 2021-06-10

## 2021-06-10 ENCOUNTER — OUTPATIENT (OUTPATIENT)
Dept: OUTPATIENT SERVICES | Facility: HOSPITAL | Age: 51
LOS: 1 days | Discharge: HOME | End: 2021-06-10
Payer: MEDICAID

## 2021-06-10 DIAGNOSIS — R92.2 INCONCLUSIVE MAMMOGRAM: ICD-10-CM

## 2021-06-10 DIAGNOSIS — Z12.31 ENCOUNTER FOR SCREENING MAMMOGRAM FOR MALIGNANT NEOPLASM OF BREAST: ICD-10-CM

## 2021-06-10 PROCEDURE — 77067 SCR MAMMO BI INCL CAD: CPT | Mod: 26

## 2021-06-10 PROCEDURE — 77063 BREAST TOMOSYNTHESIS BI: CPT | Mod: 26

## 2021-06-10 PROCEDURE — 76641 ULTRASOUND BREAST COMPLETE: CPT | Mod: 26,50

## 2021-06-15 ENCOUNTER — NON-APPOINTMENT (OUTPATIENT)
Age: 51
End: 2021-06-15

## 2022-07-26 ENCOUNTER — RESULT REVIEW (OUTPATIENT)
Age: 52
End: 2022-07-26

## 2022-07-26 ENCOUNTER — OUTPATIENT (OUTPATIENT)
Dept: OUTPATIENT SERVICES | Facility: HOSPITAL | Age: 52
LOS: 1 days | Discharge: HOME | End: 2022-07-26

## 2022-07-26 DIAGNOSIS — R92.2 INCONCLUSIVE MAMMOGRAM: ICD-10-CM

## 2022-07-26 DIAGNOSIS — Z12.31 ENCOUNTER FOR SCREENING MAMMOGRAM FOR MALIGNANT NEOPLASM OF BREAST: ICD-10-CM

## 2022-07-26 PROCEDURE — 76641 ULTRASOUND BREAST COMPLETE: CPT | Mod: 26,50

## 2022-07-26 PROCEDURE — 77063 BREAST TOMOSYNTHESIS BI: CPT | Mod: 26

## 2022-07-26 PROCEDURE — 77067 SCR MAMMO BI INCL CAD: CPT | Mod: 26

## 2023-11-25 ENCOUNTER — OUTPATIENT (OUTPATIENT)
Dept: OUTPATIENT SERVICES | Facility: HOSPITAL | Age: 53
LOS: 1 days | End: 2023-11-25
Payer: MEDICAID

## 2023-11-25 DIAGNOSIS — R92.2 INCONCLUSIVE MAMMOGRAM: ICD-10-CM

## 2023-11-25 DIAGNOSIS — Z12.31 ENCOUNTER FOR SCREENING MAMMOGRAM FOR MALIGNANT NEOPLASM OF BREAST: ICD-10-CM

## 2023-11-25 PROCEDURE — 76641 ULTRASOUND BREAST COMPLETE: CPT | Mod: 50

## 2023-11-25 PROCEDURE — 76641 ULTRASOUND BREAST COMPLETE: CPT | Mod: 26,50

## 2023-11-25 PROCEDURE — 77063 BREAST TOMOSYNTHESIS BI: CPT

## 2023-11-25 PROCEDURE — 77067 SCR MAMMO BI INCL CAD: CPT

## 2023-11-25 PROCEDURE — 77067 SCR MAMMO BI INCL CAD: CPT | Mod: 26

## 2023-11-25 PROCEDURE — 77063 BREAST TOMOSYNTHESIS BI: CPT | Mod: 26

## 2023-11-26 DIAGNOSIS — R92.2 INCONCLUSIVE MAMMOGRAM: ICD-10-CM

## 2023-11-26 DIAGNOSIS — Z12.31 ENCOUNTER FOR SCREENING MAMMOGRAM FOR MALIGNANT NEOPLASM OF BREAST: ICD-10-CM

## 2024-07-22 ENCOUNTER — EMERGENCY (EMERGENCY)
Facility: HOSPITAL | Age: 54
LOS: 0 days | Discharge: LEFT BEFORE DISCHARGE | End: 2024-07-22
Attending: EMERGENCY MEDICINE
Payer: MEDICAID

## 2024-07-22 VITALS
SYSTOLIC BLOOD PRESSURE: 118 MMHG | RESPIRATION RATE: 18 BRPM | DIASTOLIC BLOOD PRESSURE: 74 MMHG | OXYGEN SATURATION: 99 % | TEMPERATURE: 98 F | HEART RATE: 99 BPM | WEIGHT: 218.04 LBS | HEIGHT: 65 IN

## 2024-07-22 DIAGNOSIS — Z53.29 PROCEDURE AND TREATMENT NOT CARRIED OUT BECAUSE OF PATIENT'S DECISION FOR OTHER REASONS: ICD-10-CM

## 2024-07-22 DIAGNOSIS — R79.1 ABNORMAL COAGULATION PROFILE: ICD-10-CM

## 2024-07-22 DIAGNOSIS — E03.9 HYPOTHYROIDISM, UNSPECIFIED: ICD-10-CM

## 2024-07-22 DIAGNOSIS — R05.9 COUGH, UNSPECIFIED: ICD-10-CM

## 2024-07-22 DIAGNOSIS — J06.9 ACUTE UPPER RESPIRATORY INFECTION, UNSPECIFIED: ICD-10-CM

## 2024-07-22 LAB
ALBUMIN SERPL ELPH-MCNC: 3.7 G/DL — SIGNIFICANT CHANGE UP (ref 3.5–5.2)
ALP SERPL-CCNC: 71 U/L — SIGNIFICANT CHANGE UP (ref 30–115)
ALT FLD-CCNC: 31 U/L — SIGNIFICANT CHANGE UP (ref 0–41)
ANION GAP SERPL CALC-SCNC: 9 MMOL/L — SIGNIFICANT CHANGE UP (ref 7–14)
AST SERPL-CCNC: 40 U/L — SIGNIFICANT CHANGE UP (ref 0–41)
BASOPHILS # BLD AUTO: 0.03 K/UL — SIGNIFICANT CHANGE UP (ref 0–0.2)
BASOPHILS NFR BLD AUTO: 0.3 % — SIGNIFICANT CHANGE UP (ref 0–1)
BILIRUB SERPL-MCNC: 0.2 MG/DL — SIGNIFICANT CHANGE UP (ref 0.2–1.2)
BUN SERPL-MCNC: 17 MG/DL — SIGNIFICANT CHANGE UP (ref 10–20)
CALCIUM SERPL-MCNC: 8.7 MG/DL — SIGNIFICANT CHANGE UP (ref 8.4–10.5)
CHLORIDE SERPL-SCNC: 104 MMOL/L — SIGNIFICANT CHANGE UP (ref 98–110)
CO2 SERPL-SCNC: 24 MMOL/L — SIGNIFICANT CHANGE UP (ref 17–32)
CREAT SERPL-MCNC: 0.8 MG/DL — SIGNIFICANT CHANGE UP (ref 0.7–1.5)
D DIMER BLD IA.RAPID-MCNC: 274 NG/ML DDU — HIGH
EGFR: 88 ML/MIN/1.73M2 — SIGNIFICANT CHANGE UP
EOSINOPHIL # BLD AUTO: 0.33 K/UL — SIGNIFICANT CHANGE UP (ref 0–0.7)
EOSINOPHIL NFR BLD AUTO: 3.1 % — SIGNIFICANT CHANGE UP (ref 0–8)
GLUCOSE SERPL-MCNC: 100 MG/DL — HIGH (ref 70–99)
HCT VFR BLD CALC: 33.6 % — LOW (ref 37–47)
HGB BLD-MCNC: 10.8 G/DL — LOW (ref 12–16)
IMM GRANULOCYTES NFR BLD AUTO: 0.5 % — HIGH (ref 0.1–0.3)
LYMPHOCYTES # BLD AUTO: 2.89 K/UL — SIGNIFICANT CHANGE UP (ref 1.2–3.4)
LYMPHOCYTES # BLD AUTO: 26.8 % — SIGNIFICANT CHANGE UP (ref 20.5–51.1)
MCHC RBC-ENTMCNC: 28.6 PG — SIGNIFICANT CHANGE UP (ref 27–31)
MCHC RBC-ENTMCNC: 32.1 G/DL — SIGNIFICANT CHANGE UP (ref 32–37)
MCV RBC AUTO: 89.1 FL — SIGNIFICANT CHANGE UP (ref 81–99)
MONOCYTES # BLD AUTO: 0.69 K/UL — HIGH (ref 0.1–0.6)
MONOCYTES NFR BLD AUTO: 6.4 % — SIGNIFICANT CHANGE UP (ref 1.7–9.3)
NEUTROPHILS # BLD AUTO: 6.79 K/UL — HIGH (ref 1.4–6.5)
NEUTROPHILS NFR BLD AUTO: 62.9 % — SIGNIFICANT CHANGE UP (ref 42.2–75.2)
NRBC # BLD: 0 /100 WBCS — SIGNIFICANT CHANGE UP (ref 0–0)
PLATELET # BLD AUTO: 364 K/UL — SIGNIFICANT CHANGE UP (ref 130–400)
PMV BLD: 9.6 FL — SIGNIFICANT CHANGE UP (ref 7.4–10.4)
POTASSIUM SERPL-MCNC: 4.3 MMOL/L — SIGNIFICANT CHANGE UP (ref 3.5–5)
POTASSIUM SERPL-SCNC: 4.3 MMOL/L — SIGNIFICANT CHANGE UP (ref 3.5–5)
PROT SERPL-MCNC: 6.9 G/DL — SIGNIFICANT CHANGE UP (ref 6–8)
RBC # BLD: 3.77 M/UL — LOW (ref 4.2–5.4)
RBC # FLD: 13.6 % — SIGNIFICANT CHANGE UP (ref 11.5–14.5)
SODIUM SERPL-SCNC: 137 MMOL/L — SIGNIFICANT CHANGE UP (ref 135–146)
WBC # BLD: 10.78 K/UL — SIGNIFICANT CHANGE UP (ref 4.8–10.8)
WBC # FLD AUTO: 10.78 K/UL — SIGNIFICANT CHANGE UP (ref 4.8–10.8)

## 2024-07-22 PROCEDURE — 80053 COMPREHEN METABOLIC PANEL: CPT

## 2024-07-22 PROCEDURE — 99285 EMERGENCY DEPT VISIT HI MDM: CPT

## 2024-07-22 PROCEDURE — 99283 EMERGENCY DEPT VISIT LOW MDM: CPT | Mod: 25

## 2024-07-22 PROCEDURE — 82962 GLUCOSE BLOOD TEST: CPT

## 2024-07-22 PROCEDURE — 36415 COLL VENOUS BLD VENIPUNCTURE: CPT

## 2024-07-22 PROCEDURE — 71046 X-RAY EXAM CHEST 2 VIEWS: CPT

## 2024-07-22 PROCEDURE — 85025 COMPLETE CBC W/AUTO DIFF WBC: CPT

## 2024-07-22 PROCEDURE — 94640 AIRWAY INHALATION TREATMENT: CPT

## 2024-07-22 PROCEDURE — 96372 THER/PROPH/DIAG INJ SC/IM: CPT

## 2024-07-22 PROCEDURE — 71046 X-RAY EXAM CHEST 2 VIEWS: CPT | Mod: 26

## 2024-07-22 PROCEDURE — 36000 PLACE NEEDLE IN VEIN: CPT

## 2024-07-22 PROCEDURE — 85379 FIBRIN DEGRADATION QUANT: CPT

## 2024-07-22 RX ORDER — BENZONATATE 100 MG/1
100 TABLET ORAL ONCE
Refills: 0 | Status: COMPLETED | OUTPATIENT
Start: 2024-07-22 | End: 2024-07-22

## 2024-07-22 RX ORDER — KETOROLAC TROMETHAMINE 30 MG/ML
30 INJECTION, SOLUTION INTRAMUSCULAR ONCE
Refills: 0 | Status: DISCONTINUED | OUTPATIENT
Start: 2024-07-22 | End: 2024-07-22

## 2024-07-22 RX ORDER — IPRATROPIUM BROMIDE AND ALBUTEROL SULFATE .5; 3 MG/3ML; MG/3ML
3 SOLUTION RESPIRATORY (INHALATION) ONCE
Refills: 0 | Status: COMPLETED | OUTPATIENT
Start: 2024-07-22 | End: 2024-07-22

## 2024-07-22 RX ORDER — AMOXICILLIN 500 MG
1 CAPSULE ORAL
Qty: 14 | Refills: 0
Start: 2024-07-22 | End: 2024-07-28

## 2024-07-22 RX ADMIN — BENZONATATE 100 MILLIGRAM(S): 100 TABLET ORAL at 01:31

## 2024-07-22 RX ADMIN — KETOROLAC TROMETHAMINE 30 MILLIGRAM(S): 30 INJECTION, SOLUTION INTRAMUSCULAR at 01:31

## 2024-07-22 RX ADMIN — IPRATROPIUM BROMIDE AND ALBUTEROL SULFATE 3 MILLILITER(S): .5; 3 SOLUTION RESPIRATORY (INHALATION) at 01:31

## 2024-07-22 NOTE — ED PROVIDER NOTE - PATIENT PORTAL LINK FT
You can access the FollowMyHealth Patient Portal offered by [facility] by registering at the following website: [email or web address]. By joining [employer]s FollowMyHealth portal, you will also be able to view your health information using other applications (apps) compatible with our system.

## 2024-07-22 NOTE — ED PROVIDER NOTE - NSFOLLOWUPINSTRUCTIONS_ED_ALL_ED_FT
Please follow up with your primary care doctor in 1-3 days  Please be aware of any new or worsening signs or symptoms that should prompt your return to the ER.      Acute Cough    WHAT YOU NEED TO KNOW:    An acute cough can last up to 3 weeks. Common causes of an acute cough include a cold, allergies, or a lung infection.     DISCHARGE INSTRUCTIONS:    Return to the emergency department if:     You have trouble breathing or feel short of breath.      You cough up blood, or you see blood in your mucus.       You faint or feel weak or dizzy.       You have chest pain when you cough or take a deep breath.       You have new wheezing.     Contact your healthcare provider if:     You have a fever.       Your cough lasts longer than 4 weeks.       Your symptoms do not improve with treatment.       You have questions or concerns about your condition or care.     Medicines:     Medicines may be needed to stop the cough, decrease swelling in your airways, or help open your airways. Medicine may also be given to help you cough up mucus. Ask your healthcare provider what over-the-counter medicines you can take. If you have an infection caused by bacteria, you may need antibiotics.       Take your medicine as directed. Contact your healthcare provider if you think your medicine is not helping or if you have side effects. Tell him or her if you are allergic to any medicine. Keep a list of the medicines, vitamins, and herbs you take. Include the amounts, and when and why you take them. Bring the list or the pill bottles to follow-up visits. Carry your medicine list with you in case of an emergency.    Manage your symptoms:     Do not smoke and stay away from others who smoke. Nicotine and other chemicals in cigarettes and cigars can cause lung damage and make your cough worse. Ask your healthcare provider for information if you currently smoke and need help to quit. E-cigarettes or smokeless tobacco still contain nicotine. Talk to your healthcare provider before you use these products.       Drink extra liquids as directed. Liquids will help thin and loosen mucus so you can cough it up. Liquids will also help prevent dehydration. Examples of good liquids to drink include water, fruit juice, and broth. Do not drink liquids that contain caffeine. Caffeine can increase your risk for dehydration. Ask your healthcare provider how much liquid to drink each day.       Rest as directed. Do not do activities that make your cough worse, such as exercise.       Use a humidifier or vaporizer. Use a cool mist humidifier or a vaporizer to increase air moisture in your home. This may make it easier for you to breathe and help decrease your cough.       Eat 2 to 5 mL of honey 2 times each day. Honey can help thin mucus and decrease your cough.       Use cough drops or lozenges. These can help decrease throat irritation and your cough.     Follow up with your healthcare provider as directed: Write down your questions so you remember to ask them during your visits.        © Copyright [employer] 2019 All illustrations and images included in CareNotes are the copyrighted property of [employer] or [employer].

## 2024-07-22 NOTE — ED PROVIDER NOTE - PHYSICAL EXAMINATION
CONSTITUTIONAL: non-toxic appearing female, nad  SKIN: skin exam is warm and dry  HEAD: Normocephalic; atraumatic.  ENT: +mildly erythematous oropharynx, no exudates, uvula midline, tolerating secretions, no muffled voice or drooling   NECK: ROM intact  RESP: No wheezes, rales or rhonchi. Good air movement bilaterally  NEURO: awake, alert, following commands, oriented, grossly unremarkable. No Focal deficits. GCS 15.   PSYCH: Cooperative, appropriate.

## 2024-07-22 NOTE — ED PROVIDER NOTE - DIFFERENTIAL DIAGNOSIS
Electrolyte abnormalities, dehydration, ODALYS, hyperglycemia, hypoglycemia, symptomatic anemia.  r/o PTx, r/o PE. Differential Diagnosis

## 2024-07-22 NOTE — ED ADULT NURSE NOTE - AS PAIN REST
HPI:  Patient comes in today for   Chief Complaint   Patient presents with    3 Month Follow-Up     HTN   Here for 3 month f/u,h/o HTN and her blood pressure has been fairly stable at home. ,has stopped taking catapress for sometime due to feeling tired and  It drops her BP. Denies any dizziness,chest pain or SOB. Also h/o Hyperlipidemia and hypothyroidism,stays fairly active,does her own household work. sleep is good. No h/o falls. Is seeing opthalmology for macular degeneration is stable. recently got her hearing aids and is helping with hearing. Bri Cotton HISTORY:  Past Medical History:   Diagnosis Date    Cervical osteoarthritis     By history.  Dry eye syndrome     Early cataracts, bilateral     Euthyroid goiter     history of     Hypertension     Hypertriglyceridemia     Hypokalemia     Hypothyroidism     Low back pain     Macular degeneration     Navicular fracture     right, history of     Onychomycosis     Right 1, 2, Left 1, 3.    Osteopenia     Pneumonia 1985    Ptosis     Physiologic.  Vitreous degeneration        Family History   Problem Relation Age of Onset    Stroke Father     High Blood Pressure Mother     Stroke Sister     Other Daughter         Cerebral palsy.  Glaucoma Daughter     Cataracts Neg Hx     Diabetes Neg Hx        Social History     Socioeconomic History    Marital status:       Spouse name: Not on file    Number of children: Not on file    Years of education: Not on file    Highest education level: Not on file   Occupational History    Not on file   Social Needs    Financial resource strain: Not on file    Food insecurity:     Worry: Not on file     Inability: Not on file    Transportation needs:     Medical: Not on file     Non-medical: Not on file   Tobacco Use    Smoking status: Never Smoker    Smokeless tobacco: Never Used   Substance and Sexual Activity    Alcohol use: No     Alcohol/week: 0.0 oz    Drug use: No    Sexual activity: Not on file   Lifestyle    Physical activity:     Days per week: Not on file     Minutes per session: Not on file    Stress: Not on file   Relationships    Social connections:     Talks on phone: Not on file     Gets together: Not on file     Attends Yazidism service: Not on file     Active member of club or organization: Not on file     Attends meetings of clubs or organizations: Not on file     Relationship status: Not on file    Intimate partner violence:     Fear of current or ex partner: Not on file     Emotionally abused: Not on file     Physically abused: Not on file     Forced sexual activity: Not on file   Other Topics Concern    Not on file   Social History Narrative    Not on file       Current Outpatient Medications   Medication Sig Dispense Refill    benazepril (LOTENSIN) 40 MG tablet Take 1 tablet by mouth daily 90 tablet 2    cloNIDine (CATAPRES) 0.1 MG tablet Take 1 tablet by mouth daily 90 tablet 2    carvedilol (COREG) 12.5 MG tablet take 1 tablet by mouth twice a day 180 tablet 2    hydrochlorothiazide (HYDRODIURIL) 50 MG tablet Take 1 tablet by mouth daily 90 tablet 2    levothyroxine (SYNTHROID) 75 MCG tablet take 1 tablet by mouth once daily 90 tablet 2    Lactobacillus (ACIDOPHILUS) CAPS capsule Take 1 capsule by mouth daily 90 capsule 2    potassium chloride (KLOR-CON M) 10 MEQ extended release tablet 3 TABLETS  tablet 2    Handicap Placard MISC by Does not apply route Expires in 5 years 1 each 0    Multiple Vitamins-Minerals (PRESERVISION AREDS 2) CAPS Take 1 capsule by mouth 2 times daily      Ascorbic Acid (VITAMIN C) 500 MG tablet Take 500 mg by mouth daily      carboxymethylcellulose 1 % ophthalmic solution Place 1 drop into both eyes as needed for Dry Eyes.  Multiple Vitamins-Minerals (PRESERVISION AREDS) TABS Take  by mouth 2 times daily.  aspirin 81 MG tablet Take 81 mg by mouth daily.  MULTIPLE VITAMIN PO Take  by mouth daily.       Coenzyme Q10 0 (no pain/absence of nonverbal indicators of pain)

## 2024-07-22 NOTE — ED PROVIDER NOTE - ATTENDING APP SHARED VISIT CONTRIBUTION OF CARE
Patient presents to ED c/o cough/congestion and pain on breathing. Denies f/c/n/v/abd pain.   Lungs: CTA, no wheezing, no crackles.  Heart: s1, s2, rrr, no M/G.  A/P: URI  r/o Pneumonia,   r/o PE,   labs, CXR,   reevaluation.

## 2024-07-22 NOTE — ED PROVIDER NOTE - OBJECTIVE STATEMENT
54 year old female, past medical history hypothyroidism, who presents with cough. patient with sore throat, post-nasal drip and dry cough that began x1 week ago. denies f/c, rhinorrhea, headache, shortness of breath, nausea/vomiting. non-smoker.

## 2024-07-22 NOTE — ED PROVIDER NOTE - NSFOLLOWUPCLINICS_GEN_ALL_ED_FT
[facility]  Medicine  [address], [place]   Phone: [phone]  Fax:   Follow Up Time: 1-3 Days

## 2024-07-22 NOTE — ED PROVIDER NOTE - CARE PLAN
Principal Discharge DX:	Cough   1 Principal Discharge DX:	Cough  Secondary Diagnosis:	Elevated d-dimer

## 2024-07-22 NOTE — ED PROVIDER NOTE - EKG/XRAY ADDITIONAL INFORMATION
Chest X-rays reviewed and interpreted by me Dr. Jovel and shows no actue findings. No Pneumothorax, no free air, no effusions, and these findings discussed with patient.

## 2025-01-30 ENCOUNTER — OUTPATIENT (OUTPATIENT)
Dept: OUTPATIENT SERVICES | Facility: HOSPITAL | Age: 55
LOS: 1 days | End: 2025-01-30
Payer: MEDICAID

## 2025-01-30 DIAGNOSIS — Z12.31 ENCOUNTER FOR SCREENING MAMMOGRAM FOR MALIGNANT NEOPLASM OF BREAST: ICD-10-CM

## 2025-01-30 DIAGNOSIS — R92.2 INCONCLUSIVE MAMMOGRAM: ICD-10-CM

## 2025-01-30 PROCEDURE — 77063 BREAST TOMOSYNTHESIS BI: CPT | Mod: 26

## 2025-01-30 PROCEDURE — 77063 BREAST TOMOSYNTHESIS BI: CPT

## 2025-01-30 PROCEDURE — 77067 SCR MAMMO BI INCL CAD: CPT | Mod: 26

## 2025-01-30 PROCEDURE — 76641 ULTRASOUND BREAST COMPLETE: CPT | Mod: 26,50

## 2025-01-30 PROCEDURE — 77067 SCR MAMMO BI INCL CAD: CPT

## 2025-01-30 PROCEDURE — 76641 ULTRASOUND BREAST COMPLETE: CPT | Mod: 50

## 2025-01-31 DIAGNOSIS — R92.2 INCONCLUSIVE MAMMOGRAM: ICD-10-CM

## 2025-01-31 DIAGNOSIS — Z12.31 ENCOUNTER FOR SCREENING MAMMOGRAM FOR MALIGNANT NEOPLASM OF BREAST: ICD-10-CM

## 2025-09-18 ENCOUNTER — APPOINTMENT (OUTPATIENT)
Dept: ORTHOPEDIC SURGERY | Facility: CLINIC | Age: 55
End: 2025-09-18

## 2025-09-18 DIAGNOSIS — Z00.00 ENCOUNTER FOR GENERAL ADULT MEDICAL EXAMINATION W/OUT ABNORMAL FINDINGS: ICD-10-CM
